# Patient Record
Sex: MALE
[De-identification: names, ages, dates, MRNs, and addresses within clinical notes are randomized per-mention and may not be internally consistent; named-entity substitution may affect disease eponyms.]

---
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
## 2024-07-08 ENCOUNTER — NON-APPOINTMENT (OUTPATIENT)
Age: 24
End: 2024-07-08

## 2024-07-10 ENCOUNTER — INPATIENT (INPATIENT)
Facility: HOSPITAL | Age: 24
LOS: 0 days | Discharge: HOME | DRG: 392 | End: 2024-07-11
Attending: STUDENT IN AN ORGANIZED HEALTH CARE EDUCATION/TRAINING PROGRAM | Admitting: STUDENT IN AN ORGANIZED HEALTH CARE EDUCATION/TRAINING PROGRAM
Payer: COMMERCIAL

## 2024-07-10 VITALS
OXYGEN SATURATION: 96 % | SYSTOLIC BLOOD PRESSURE: 127 MMHG | HEIGHT: 69 IN | TEMPERATURE: 99 F | HEART RATE: 81 BPM | WEIGHT: 160.94 LBS | RESPIRATION RATE: 18 BRPM | DIASTOLIC BLOOD PRESSURE: 77 MMHG

## 2024-07-10 PROCEDURE — 99285 EMERGENCY DEPT VISIT HI MDM: CPT

## 2024-07-10 NOTE — ED ADULT TRIAGE NOTE - PAIN: PRESENCE, MLM
Additional Notes (Optional): Patientâs scalp is healing well from 3rd blue light treatment. No more treatments needed. Detail Level: Detailed Hide Additional Notes?: No complains of pain/discomfort

## 2024-07-10 NOTE — ED ADULT TRIAGE NOTE - CHIEF COMPLAINT QUOTE
"I was diagnosed with colitis at [facility] today and tonight I threw up and it was dark brown"

## 2024-07-11 VITALS
HEART RATE: 61 BPM | TEMPERATURE: 98 F | SYSTOLIC BLOOD PRESSURE: 142 MMHG | RESPIRATION RATE: 18 BRPM | OXYGEN SATURATION: 97 % | DIASTOLIC BLOOD PRESSURE: 88 MMHG

## 2024-07-11 DIAGNOSIS — K52.9 NONINFECTIVE GASTROENTERITIS AND COLITIS, UNSPECIFIED: ICD-10-CM

## 2024-07-11 DIAGNOSIS — K92.0 HEMATEMESIS: ICD-10-CM

## 2024-07-11 DIAGNOSIS — Z29.9 ENCOUNTER FOR PROPHYLACTIC MEASURES, UNSPECIFIED: ICD-10-CM

## 2024-07-11 LAB
ALBUMIN SERPL ELPH-MCNC: 4.6 G/DL — SIGNIFICANT CHANGE UP (ref 3.3–5)
ALP SERPL-CCNC: 57 U/L — SIGNIFICANT CHANGE UP (ref 30–120)
ALT FLD-CCNC: 15 U/L — SIGNIFICANT CHANGE UP (ref 10–60)
ANION GAP SERPL CALC-SCNC: 8 MMOL/L — SIGNIFICANT CHANGE UP (ref 5–17)
APPEARANCE UR: CLEAR — SIGNIFICANT CHANGE UP
AST SERPL-CCNC: 17 U/L — SIGNIFICANT CHANGE UP (ref 10–40)
BASOPHILS # BLD AUTO: 0.02 K/UL — SIGNIFICANT CHANGE UP (ref 0–0.2)
BASOPHILS NFR BLD AUTO: 0.2 % — SIGNIFICANT CHANGE UP (ref 0–2)
BILIRUB SERPL-MCNC: 0.6 MG/DL — SIGNIFICANT CHANGE UP (ref 0.2–1.2)
BILIRUB UR-MCNC: NEGATIVE — SIGNIFICANT CHANGE UP
BUN SERPL-MCNC: 11 MG/DL — SIGNIFICANT CHANGE UP (ref 7–23)
CALCIUM SERPL-MCNC: 9.5 MG/DL — SIGNIFICANT CHANGE UP (ref 8.4–10.5)
CHLORIDE SERPL-SCNC: 104 MMOL/L — SIGNIFICANT CHANGE UP (ref 96–108)
CO2 SERPL-SCNC: 27 MMOL/L — SIGNIFICANT CHANGE UP (ref 22–31)
COLOR SPEC: YELLOW — SIGNIFICANT CHANGE UP
CREAT SERPL-MCNC: 1.12 MG/DL — SIGNIFICANT CHANGE UP (ref 0.5–1.3)
DIFF PNL FLD: NEGATIVE — SIGNIFICANT CHANGE UP
EC EAEA GENE STL QL NAA+PROBE: DETECTED
EGFR: 96 ML/MIN/1.73M2 — SIGNIFICANT CHANGE UP
EOSINOPHIL # BLD AUTO: 0 K/UL — SIGNIFICANT CHANGE UP (ref 0–0.5)
EOSINOPHIL NFR BLD AUTO: 0 % — SIGNIFICANT CHANGE UP (ref 0–6)
GI PCR PANEL: DETECTED
GLUCOSE SERPL-MCNC: 107 MG/DL — HIGH (ref 70–99)
GLUCOSE UR QL: NEGATIVE MG/DL — SIGNIFICANT CHANGE UP
HCT VFR BLD CALC: 37.7 % — LOW (ref 39–50)
HGB BLD-MCNC: 12.9 G/DL — LOW (ref 13–17)
IMM GRANULOCYTES NFR BLD AUTO: 0.5 % — SIGNIFICANT CHANGE UP (ref 0–0.9)
KETONES UR-MCNC: 15 MG/DL
LEUKOCYTE ESTERASE UR-ACNC: NEGATIVE — SIGNIFICANT CHANGE UP
LIDOCAIN IGE QN: 15 U/L — LOW (ref 16–77)
LYMPHOCYTES # BLD AUTO: 1.19 K/UL — SIGNIFICANT CHANGE UP (ref 1–3.3)
LYMPHOCYTES # BLD AUTO: 11.3 % — LOW (ref 13–44)
MCHC RBC-ENTMCNC: 27.6 PG — SIGNIFICANT CHANGE UP (ref 27–34)
MCHC RBC-ENTMCNC: 34.2 GM/DL — SIGNIFICANT CHANGE UP (ref 32–36)
MCV RBC AUTO: 80.6 FL — SIGNIFICANT CHANGE UP (ref 80–100)
MONOCYTES # BLD AUTO: 0.97 K/UL — HIGH (ref 0–0.9)
MONOCYTES NFR BLD AUTO: 9.2 % — SIGNIFICANT CHANGE UP (ref 2–14)
NEUTROPHILS # BLD AUTO: 8.33 K/UL — HIGH (ref 1.8–7.4)
NEUTROPHILS NFR BLD AUTO: 78.8 % — HIGH (ref 43–77)
NITRITE UR-MCNC: NEGATIVE — SIGNIFICANT CHANGE UP
NRBC # BLD: 0 /100 WBCS — SIGNIFICANT CHANGE UP (ref 0–0)
PH UR: 7.5 — SIGNIFICANT CHANGE UP (ref 5–8)
PLATELET # BLD AUTO: 251 K/UL — SIGNIFICANT CHANGE UP (ref 150–400)
POTASSIUM SERPL-MCNC: 3.8 MMOL/L — SIGNIFICANT CHANGE UP (ref 3.5–5.3)
POTASSIUM SERPL-SCNC: 3.8 MMOL/L — SIGNIFICANT CHANGE UP (ref 3.5–5.3)
PROT SERPL-MCNC: 7.9 G/DL — SIGNIFICANT CHANGE UP (ref 6–8.3)
PROT UR-MCNC: NEGATIVE MG/DL — SIGNIFICANT CHANGE UP
RBC # BLD: 4.68 M/UL — SIGNIFICANT CHANGE UP (ref 4.2–5.8)
RBC # FLD: 12.4 % — SIGNIFICANT CHANGE UP (ref 10.3–14.5)
SODIUM SERPL-SCNC: 139 MMOL/L — SIGNIFICANT CHANGE UP (ref 135–145)
SP GR SPEC: 1.01 — SIGNIFICANT CHANGE UP (ref 1–1.03)
UROBILINOGEN FLD QL: 0.2 MG/DL — SIGNIFICANT CHANGE UP (ref 0.2–1)
WBC # BLD: 10.56 K/UL — HIGH (ref 3.8–10.5)
WBC # FLD AUTO: 10.56 K/UL — HIGH (ref 3.8–10.5)

## 2024-07-11 PROCEDURE — 80053 COMPREHEN METABOLIC PANEL: CPT

## 2024-07-11 PROCEDURE — 99285 EMERGENCY DEPT VISIT HI MDM: CPT | Mod: 25

## 2024-07-11 PROCEDURE — 99222 1ST HOSP IP/OBS MODERATE 55: CPT

## 2024-07-11 PROCEDURE — 81003 URINALYSIS AUTO W/O SCOPE: CPT

## 2024-07-11 PROCEDURE — 87507 IADNA-DNA/RNA PROBE TQ 12-25: CPT

## 2024-07-11 PROCEDURE — 36415 COLL VENOUS BLD VENIPUNCTURE: CPT

## 2024-07-11 PROCEDURE — 85025 COMPLETE CBC W/AUTO DIFF WBC: CPT

## 2024-07-11 PROCEDURE — 96375 TX/PRO/DX INJ NEW DRUG ADDON: CPT

## 2024-07-11 PROCEDURE — 96374 THER/PROPH/DIAG INJ IV PUSH: CPT

## 2024-07-11 PROCEDURE — 83690 ASSAY OF LIPASE: CPT

## 2024-07-11 RX ORDER — PIPERACILLIN SODIUM AND TAZOBACTAM SODIUM 3; .375 G/15ML; G/15ML
3.38 INJECTION, POWDER, LYOPHILIZED, FOR SOLUTION INTRAVENOUS ONCE
Refills: 0 | Status: COMPLETED | OUTPATIENT
Start: 2024-07-11 | End: 2024-07-11

## 2024-07-11 RX ORDER — SODIUM CHLORIDE 0.9 % (FLUSH) 0.9 %
1000 SYRINGE (ML) INJECTION ONCE
Refills: 0 | Status: COMPLETED | OUTPATIENT
Start: 2024-07-11 | End: 2024-07-11

## 2024-07-11 RX ORDER — DIAZEPAM 10 MG/1
5 TABLET ORAL ONCE
Refills: 0 | Status: DISCONTINUED | OUTPATIENT
Start: 2024-07-11 | End: 2024-07-11

## 2024-07-11 RX ORDER — ONDANSETRON HYDROCHLORIDE 2 MG/ML
1 INJECTION INTRAMUSCULAR; INTRAVENOUS
Refills: 0 | DISCHARGE

## 2024-07-11 RX ORDER — DEXTROSE MONOHYDRATE AND SODIUM CHLORIDE 5; .3 G/100ML; G/100ML
1000 INJECTION, SOLUTION INTRAVENOUS
Refills: 0 | Status: DISCONTINUED | OUTPATIENT
Start: 2024-07-11 | End: 2024-07-11

## 2024-07-11 RX ORDER — METOCLOPRAMIDE 5 MG/5ML
10 SOLUTION ORAL ONCE
Refills: 0 | Status: COMPLETED | OUTPATIENT
Start: 2024-07-11 | End: 2024-07-11

## 2024-07-11 RX ORDER — PANTOPRAZOLE SODIUM 40 MG/10ML
40 INJECTION, POWDER, FOR SOLUTION INTRAVENOUS
Refills: 0 | Status: DISCONTINUED | OUTPATIENT
Start: 2024-07-11 | End: 2024-07-11

## 2024-07-11 RX ORDER — PANTOPRAZOLE SODIUM 40 MG/10ML
40 INJECTION, POWDER, FOR SOLUTION INTRAVENOUS DAILY
Refills: 0 | Status: DISCONTINUED | OUTPATIENT
Start: 2024-07-11 | End: 2024-07-11

## 2024-07-11 RX ORDER — PIPERACILLIN SODIUM AND TAZOBACTAM SODIUM 3; .375 G/15ML; G/15ML
3.38 INJECTION, POWDER, LYOPHILIZED, FOR SOLUTION INTRAVENOUS EVERY 8 HOURS
Refills: 0 | Status: DISCONTINUED | OUTPATIENT
Start: 2024-07-11 | End: 2024-07-11

## 2024-07-11 RX ORDER — CEFPODOXIME PROXETIL 50 MG/5 ML
1 SUSPENSION, RECONSTITUTED, ORAL (ML) ORAL
Qty: 14 | Refills: 0
Start: 2024-07-11 | End: 2024-07-17

## 2024-07-11 RX ORDER — METRONIDAZOLE 500 MG/1
1 TABLET ORAL
Refills: 0 | DISCHARGE

## 2024-07-11 RX ORDER — MAGNESIUM, ALUMINUM HYDROXIDE 400-400
30 TABLET,CHEWABLE ORAL EVERY 4 HOURS
Refills: 0 | Status: DISCONTINUED | OUTPATIENT
Start: 2024-07-11 | End: 2024-07-11

## 2024-07-11 RX ORDER — CIPROFLOXACIN HCL 500 MG
1 TABLET ORAL
Refills: 0 | DISCHARGE

## 2024-07-11 RX ORDER — ONDANSETRON HYDROCHLORIDE 2 MG/ML
4 INJECTION INTRAMUSCULAR; INTRAVENOUS EVERY 8 HOURS
Refills: 0 | Status: DISCONTINUED | OUTPATIENT
Start: 2024-07-11 | End: 2024-07-11

## 2024-07-11 RX ORDER — ACETAMINOPHEN 325 MG
650 TABLET ORAL EVERY 6 HOURS
Refills: 0 | Status: DISCONTINUED | OUTPATIENT
Start: 2024-07-11 | End: 2024-07-11

## 2024-07-11 RX ORDER — DIPHENHYDRAMINE HCL 12.5MG/5ML
25 ELIXIR ORAL ONCE
Refills: 0 | Status: COMPLETED | OUTPATIENT
Start: 2024-07-11 | End: 2024-07-11

## 2024-07-11 RX ORDER — METOCLOPRAMIDE 5 MG/5ML
10 SOLUTION ORAL EVERY 6 HOURS
Refills: 0 | Status: DISCONTINUED | OUTPATIENT
Start: 2024-07-11 | End: 2024-07-11

## 2024-07-11 RX ADMIN — DIAZEPAM 5 MILLIGRAM(S): 10 TABLET ORAL at 06:54

## 2024-07-11 RX ADMIN — Medication 25 MILLIGRAM(S): at 01:55

## 2024-07-11 RX ADMIN — Medication 1000 MILLILITER(S): at 01:30

## 2024-07-11 RX ADMIN — METOCLOPRAMIDE 10 MILLIGRAM(S): 5 SOLUTION ORAL at 01:29

## 2024-07-11 RX ADMIN — DIAZEPAM 5 MILLIGRAM(S): 10 TABLET ORAL at 01:54

## 2024-07-11 RX ADMIN — PANTOPRAZOLE SODIUM 40 MILLIGRAM(S): 40 INJECTION, POWDER, FOR SOLUTION INTRAVENOUS at 05:38

## 2024-07-11 RX ADMIN — PIPERACILLIN SODIUM AND TAZOBACTAM SODIUM 200 GRAM(S): 3; .375 INJECTION, POWDER, LYOPHILIZED, FOR SOLUTION INTRAVENOUS at 05:14

## 2024-07-11 RX ADMIN — PIPERACILLIN SODIUM AND TAZOBACTAM SODIUM 25 GRAM(S): 3; .375 INJECTION, POWDER, LYOPHILIZED, FOR SOLUTION INTRAVENOUS at 08:33

## 2024-07-11 RX ADMIN — DEXTROSE MONOHYDRATE AND SODIUM CHLORIDE 100 MILLILITER(S): 5; .3 INJECTION, SOLUTION INTRAVENOUS at 05:13

## 2024-07-11 RX ADMIN — METOCLOPRAMIDE 10 MILLIGRAM(S): 5 SOLUTION ORAL at 06:53

## 2024-07-11 RX ADMIN — Medication 1000 MILLILITER(S): at 00:30

## 2024-07-11 RX ADMIN — Medication 1000 MILLILITER(S): at 01:29

## 2024-07-11 RX ADMIN — Medication 1000 MILLILITER(S): at 02:29

## 2024-07-11 RX ADMIN — ONDANSETRON HYDROCHLORIDE 4 MILLIGRAM(S): 2 INJECTION INTRAMUSCULAR; INTRAVENOUS at 08:33

## 2024-07-11 NOTE — CARE COORDINATION ASSESSMENT. - OTHER PERTINENT REFERRAL INFORMATION
Sw Met w/ Pt at bedside. Pt is A & O x4. Pt has 5 DARLENE and a full flight inside and he currently lives with his father.  Pt works part time and is a student. He has no HX of KUSHAL, DME or HC and was Indep w/ all ADL's PTA. He currently does not have a PCP and can get RX at [facility]: P [phone].

## 2024-07-11 NOTE — DISCHARGE NOTE PROVIDER - NSDCMRMEDTOKEN_GEN_ALL_CORE_FT
cefpodoxime 100 mg oral tablet: 1 tab(s) orally 2 times a day  metroNIDAZOLE 500 mg oral tablet: 1 tab(s) orally 3 times a day  Zofran 4 mg oral tablet: 1 tab(s) orally every 8 hours as needed for  nausea

## 2024-07-11 NOTE — ED ADULT NURSE NOTE - OBJECTIVE STATEMENT
23yr old male walked into ED c/o vomiting x2 and thinks there may have been some blood in the vomit; states it was dark brown; pt recently diagnosed with colitis and place on antibiotics; diagnosed Wednesday morning; states he had a CT scan at a hospital in [place].

## 2024-07-11 NOTE — H&P ADULT - TIME BILLING
n/v/d, hematemesis  colitis on ct abd from [facility]  likely gastritis as well  iv ppi bid, zosyn, reglan and zofran prn, gi consult  plan discussed with patient; counseled on marijuana cessation

## 2024-07-11 NOTE — ED PROVIDER NOTE - CLINICAL SUMMARY MEDICAL DECISION MAKING FREE TEXT BOX
23y M with n/v abd pain, has already had work up and ct but has intractable n/v and pain, and looked like blood in vomit tonight - iv, labs, hydrate, control n/v, may require admission if unable to tolerate PO

## 2024-07-11 NOTE — SBIRT NOTE ADULT - NSSBIRTDRGBRIEFINTDET_GEN_A_CORE
Pt was educated about marijuana use and offered information about cessation which he declined at this time.

## 2024-07-11 NOTE — ED PROVIDER NOTE - PROGRESS NOTE DETAILS
pt continues to vomit, does appear to have blood mixed with GI fluids, not bright red, d/w pt admission for intractable vomiting, pt is anxious about staying, possibly anxious related to reglan that was recently given, will give benedryl for possible reglan reaction and also valium for continued n/v

## 2024-07-11 NOTE — ED PROVIDER NOTE - CARE PLAN
Principal Discharge DX:	Colitis  Secondary Diagnosis:	Intractable nausea and vomiting  Secondary Diagnosis:	Hematemesis   1

## 2024-07-11 NOTE — H&P ADULT - PROBLEM SELECTOR PLAN 3
Headache, Unspecified    Headaches can be caused by a number of things. The cause of your headache isn’t clear. But it doesn’t seem to be a sign of any serious illness.  You could have a tension headache or a migraine headache.  Stress can cause a tension headache. This can happen if you tense the muscles of your shoulders, neck, and scalp without knowing it. If this stress lasts long enough, you may develop a tension headache.  It is not clear why migraines occur, but transient factors called\" triggers\" can raise the risk of having a migraine attack. Migraine triggers may include emotional stress or depression, or by hormone changes during the menstrual cycle. Other triggers include birth control pills and other medicines, alcohol or caffeine, foods with tyramine, eye strain, weather changes, missed meals, and lack of sleep or oversleeping.  Other causes of headache include:  · Viral illness with high fever  · Head injury with concussion  · Sinus, ear, or throat infection  · Dental pain and jaw joint (TMJ) pain  More serious but less common causes of headache include stroke, brain hemorrhage, brain tumor, meningitis, and encephalitis.  Home care  Follow these tips when taking care of yourself at home:  · Don’t drive yourself home if you were given pain medicine for your headache. Instead, have someone else drive you home. Try to sleep when you get home. You should feel much better when you wake up.  · Apply heat to the back of your neck to ease a neck muscle spasm. Take care of a migraine headache by putting an ice pack on your forehead or at the base of your skull.  · If you have nausea or vomiting, eat a light diet until your headache eases.  · If you have a migraine headache, use sunglasses when in the daylight or around bright indoor lighting until your symptoms get better. Bright glaring light can make this type of headache worse.  Follow-up care  Follow up with your health care provider if your headache  doesn’t get better within the next 24 hours. Talk with your provider If you have frequent headaches. He or she can help figure out a treatment plan. By knowing the earliest signs of headache, and starting treatment right away, you may be able to stop the pain yourself.  When to seek medical advice  Call your health care provider right away if any of these occur:  · Your head pain gets worse  · Your head pain doesn’t get better within 24 hours  · You aren’t able to keep liquids down (repeated vomiting)  · Fever of 100.4ºF (38ºC) or higher, or as directed by your health care provider  · Stiff neck  · Extreme drowsiness, confusion, or fainting  · Dizziness or dizziness with spinning sensation (vertigo)  · Weakness in an arm or leg or one side of your face  · You have difficulty talking or seeing  © 1641-4964 [employer]. [address] [place] [address], [place] [address]. All rights reserved. This information is not intended as a substitute for professional medical care. Always follow your healthcare professional's instructions.         Encourage ambulation

## 2024-07-11 NOTE — H&P ADULT - NSICDXFAMHXNEG_GEN_ALL
Partially impaired: cannot see medication labels or newsprint, but can see obstacles in path, and the surrounding layout; can count fingers at arm's length cancer/congestive heart failure

## 2024-07-11 NOTE — H&P ADULT - NSHPPHYSICALEXAM_GEN_ALL_CORE
T(C): 37.1 (07-11-24 @ 01:52), Max: 37.2 (07-10-24 @ 23:37)  HR: 84 (07-11-24 @ 01:52) (81 - 84)  BP: 143/74 (07-11-24 @ 01:52) (127/77 - 143/74)  RR: 18 (07-11-24 @ 01:52) (18 - 18)  SpO2: 96% (07-11-24 @ 01:52) (96% - 96%)    GENERAL: patient appears well, no acute distress, appropriate, pleasant  EYES: sclera clear, no exudates  ENMT: moist mucous membranes  LUNGS: good air entry bilaterally, clear to auscultation, symmetric breath sounds, no wheezing or rhonchi appreciated  HEART: soft S1/S2, regular rate and rhythm, no murmurs noted, no lower extremity edema  GASTROINTESTINAL: abdomen is soft, nontender, nondistended, normoactive bowel sounds, no palpable masses  INTEGUMENT: good skin turgor, warm skin, appears well perfused  MUSCULOSKELETAL: no clubbing or cyanosis, no obvious deformity  NEUROLOGIC: awake, alert, oriented x3  HEME/LYMPH:  no obvious ecchymosis or petechiae

## 2024-07-11 NOTE — CARE COORDINATION ASSESSMENT. - NSCAREPROVIDERS_GEN_ALL_CORE_FT
CARE PROVIDERS:  Accepting Physician: Juan M Berg  Access Services: Elsie Vega  Administration: Gordo Sherwood  Admitting: Juan M Berg  Attending: Juan M Berg  Consultant: Jorge Pina  ED Attending: Mervat Dillon  ED Nurse: Angeli Traore  Emergency Medicine: Scarlett Woodward  Nurse: Saurav Mccord  Nurse: Halina Grimes  Nurse: Marianne Bee  Nurse: Angeli Traore  PCA/Nursing Assistant: Blessing Aleman  PCA/Nursing Assistant: Ke Buchanan  Primary Team: Gem De La Rosa  Primary Team: Earline Elizondo  Primary Team: Juan M Berg  : Vani Desir  : Lizzy Gordon  : Liz Maki  Team: [facility] Hospitalists, Team  UR// Supp. Assoc.: Glo Avila

## 2024-07-11 NOTE — CARE COORDINATION ASSESSMENT. - LIVES WITH, PROFILE
ANTICOAGULATION THERAPY EDUCATION   PATIENT  INSTRUCTION    Your Guide to Using Warfarin:  Please refer to this handout for questions regarding your medication, Coumadin/Warfarin. This handout includes information on dosing, blood testing, possible side effects of Coumadin/Warfarin, using other medications, dietary guidelines and safety concerns while on anticoagulation therapy.    Please contact your primary care physician and/or seek appropriate medical care if you experience:  · A serious fall  · Increased menstrual bleeding   · An injury to your head  · Notice a different color urine or stool   · Increased bleeding with teeth brushing   · If you have any other unusual bruising   · Have bleeding from your nose or a cut that doesn't stop bleeding         Call your physician immediately if you notice any signs and symptoms of a blood clot such as:  · Sudden weakness in any limb  · Dizziness or faintness   · Numbness or tingling anywhere  · New shortness of breath or chest pain   · Sudden onset of slurred speech or inability to speak  · New pain, swelling, redness or heat in any extremity   · Visual changes or loss of sight in either eye         Other factors that may affect your anticoagulation therapy include:  · Fever  · Stress   · Diarrhea  · Changes in exercise/activity level   · Vomiting         While on Anticoagulation therapy avoid:  · Pregnancy, using birth control and hormone replacement therapy    · Body piercing or tattoos      Please inform family members and other health care providers that you are on anticoagulation therapy. Make sure to carry an up-to-date medication list. You can also wear a medical alert bracelet to identify that you are on anticoagulation therapy.      
parents

## 2024-07-11 NOTE — H&P ADULT - ASSESSMENT
23yM no pmhx admitted for colitis       to the ED for complaints of n/v/d x 1 week. He states he had fried fish from a restaurant around the time sx started. He went to [facility] yesterday and had a CT scan of the abd showing mild colitis of the transverse and descending colon. He was discharged home on cipro flagyl. He continued having n/v and developed some blood in his vomit so he came to the ED. He states he was unable to keep the cipro or flagyl down. Denies fever, chills, cp, sob, abd pain, constipation.    ED Course:  Vitals: 99F, 81bpm, 127/77, 96% RA  Significant Labs: mild leukocytosis and anemia, normal cmp and lipase  Received valium, benadryl, reglan, 2L IVF in the ED     23yM no pmhx admitted for colitis       23yM no pmhx admitted for colitis and n/v with hematemesis

## 2024-07-11 NOTE — H&P ADULT - HISTORY OF PRESENT ILLNESS
23yM no pmhx presented to the ED for complaints of n/v/d x 1 week. He states he had fried fish from a restaurant around the time sx started. He went to [facility] yesterday and had a CT scan of the abd showing mild colitis of the transverse and descending colon. He was discharged home on cipro flagyl. He continued having n/v and developed some hematemesis so he came to the ED. He states he was unable to keep the cipro or flagyl down. Denies fever, chills, cp, sob, abd pain, constipation.    ED Course:  Vitals: 99F, 81bpm, 127/77, 96% RA  Significant Labs: mild leukocytosis and anemia, normal cmp and lipase  Received valium, benadryl, reglan, 2L IVF in the ED

## 2024-07-11 NOTE — CARE COORDINATION ASSESSMENT. - QUALITY OF FAMILY RELATIONSHIPS
----- Message from Ruby Cuevas sent at 5/8/2023  3:42 PM CDT -----  Regarding: Schedule Appt  Pt calling to schedule an appointment. Please advise. Requesting a call back.        [phone]                    
Spoke with patient, she saw Dr. Rivera while in the hospital in February and clinic follow up in March.  She saw Dr. Florez in April and does not want to continue with him.  Patient cancelled appointment with Dr. Florez and rescheduled with Dr. Rivera.  
supportive/involved

## 2024-07-11 NOTE — DISCHARGE NOTE PROVIDER - HOSPITAL COURSE
23M admitted for Colitis after consuming seafood.  Suspect Gastroenteritis mediated colitis.  Advised to stay for IV Antibiotics before transition to oral but wants to leave.  Will discharge with PO Antibiotics and advised to return if symptoms worsen.     CT Scan was done at [place] 24 hours prior patient brought the copy in with him.         PHYSICAL EXAM:  Vital Signs Last 24 Hrs  T(C): 36.7 (11 Jul 2024 11:04), Max: 37.2 (10 Jul 2024 23:37)  T(F): 98 (11 Jul 2024 11:04), Max: 99 (10 Jul 2024 23:37)  HR: 61 (11 Jul 2024 11:04) (61 - 84)  BP: 142/88 (11 Jul 2024 11:04) (127/77 - 156/82)  BP(mean): --  RR: 18 (11 Jul 2024 11:04) (17 - 18)  SpO2: 97% (11 Jul 2024 11:04) (95% - 97%)    Parameters below as of 11 Jul 2024 11:04  Patient On (Oxygen Delivery Method): room air        GENERAL: NAD  HEAD:  Atraumatic, Normocephalic  ENMT: Jose Mucous Membranes  NECK: Supple, No JVD, Normal thyroid  HEART: Regular rate and rhythm; No murmurs, rubs, or gallops  CHEST/LUNG: Clear to auscultation bilaterally; No rales, rhonchi, wheezing, or rubs  ABDOMEN: Mild Tenderness lower abdomen  EXTREMITIES:  2+ Peripheral Pulses, No clubbing, cyanosis, or edema  SKIN: No rashes or lesions

## 2024-07-11 NOTE — CHART NOTE - NSCHARTNOTEFT_GEN_A_CORE
Patient seen and examined.  Admitted for Gastroenteritis / Colitis.  Uncomfortable and continues to feel nauseous. Will continue antibiotics and follow up.  H&P Reviewed. Patient seen and examined.  Admitted for Gastroenteritis / Colitis.  Uncomfortable and continues to feel nauseous. Will continue antibiotics and follow up.  H&P Reviewed.    Addendum: Family at bedside with mother and girlfriend. Upset that he wont get a bed due to bed availability and wants to go home.  Advised should stay for IV Antibiotics but insistent on going home. Will discharge with PO Vantin + Flagyl and advised to return if symptoms worsen.

## 2024-07-11 NOTE — H&P ADULT - PROBLEM SELECTOR PLAN 2
Persistent n/v x 1 week with some hematemesis yesterday  - likely 2/2 gastritis given poor PO intake and continued vomiting  - CLD as tolerated; IVF; IV PPI BID; reglan and zofran prn  - could be a component of marijuana hyperemesis syndrome given daily marijuana use  - patient counseled on marijuana cessation

## 2024-07-11 NOTE — H&P ADULT - PROBLEM SELECTOR PLAN 1
CT abd from [facility] with mild colitis of the transverse and descending colon  - infectious vs ischemic colitis  - CT abd from [facility] ED with mild colitis of the transverse and descending colon  - discharged on cipro flagyl from [facility] but was unable to keep down the pills  - still with n/v/d; likely infectious vs inflammatory colitis  - will start zosyn for now  - f/u GI PCR  - GI consult f/u recs CT abd from [facility] ED with mild colitis of the transverse and descending colon  - discharged on cipro flagyl from [facility] but was unable to keep down the pills  - still with n/v/d; r/o infectious vs inflammatory colitis  - will start zosyn for now  - f/u GI PCR  - GI consult f/u recs

## 2024-07-11 NOTE — CONSULT NOTE ADULT - ASSESSMENT
colitis  ?gastroenteritis    PLAN  follow up stool studies  abx  PPI BID  Carafate q 6 hours.  bacid one tab tid  clears for now advance as tolerated  if stool negative consider imodium  replete electrolytes as needed   will follow     Discussed with Dr. Urbina.

## 2024-07-11 NOTE — ED PROVIDER NOTE - OBJECTIVE STATEMENT
23y M c/o vomiting blood, pt has h/o GERD and occasional vomiting when eating too much, which happened on July 4th - this isn't a daily occurrence but didn't seem odd given that he'd overeaten, then 2d ago started with multiple episodes of vomiting, seen at [facility] yesterday, given zofran, symptoms continued, went to [facility] ED this morning, states he had labs, ct a/p and meds, pt was told he has colitis and blood in the urine and sent home with 2 antibiotics and nausea medicine, states today he ate a banana and had some bone broth and water, tonight had 2 episodes of vomiting and it looked like there was dark blood in it both times, no fever, no diarrhea, right now does not have nausea, no abd pain now, states it mostly comes back after he eats

## 2024-07-11 NOTE — CONSULT NOTE ADULT - SUBJECTIVE AND OBJECTIVE BOX
Chief Complaint:  Patient is a 23y old  Male who presents with a chief complaint of colitis, n/v (11 Jul 2024 04:06)      HPI:  23 M admitted for colitis. Had about 1 week on n/v/d. Was seen in [place] yesterday, and was found to have mild colitis of the transverse and descending colon.  Was sent home on cipro/flagyl, took 1 dose, nausea/vomiting persisted, so came ot ED for further evaluation. Pt denies headache, dizziness, chest pain, palpitations, cough, SOB, abdominal pain, urinary symptoms, fevers, chills, weakness at this time. Tolerating clears at time of evaluation.     Allergies:  No Known Allergies      Medications:  acetaminophen     Tablet .. 650 milliGRAM(s) Oral every 6 hours PRN  aluminum hydroxide/magnesium hydroxide/simethicone Suspension 30 milliLiter(s) Oral every 4 hours PRN  lactated ringers. 1000 milliLiter(s) IV Continuous <Continuous>  melatonin 3 milliGRAM(s) Oral at bedtime PRN  metoclopramide Injectable 10 milliGRAM(s) IV Push every 6 hours PRN  ondansetron Injectable 4 milliGRAM(s) IV Push every 8 hours PRN  pantoprazole  Injectable 40 milliGRAM(s) IV Push two times a day  piperacillin/tazobactam IVPB.. 3.375 Gram(s) IV Intermittent every 8 hours      PMHX/PSHX:  No pertinent past medical history    No significant past surgical history        Family history:  No pertinent family history in first degree relatives        Social History:     ROS:     General:  No wt loss, fevers, chills, night sweats, fatigue,   Eyes:  Good vision, no reported pain  ENT:  No sore throat, pain, runny nose, dysphagia  CV:  No pain, palpitations, hypo/hypertension  Resp:  No dyspnea, cough, tachypnea, wheezing  GI: see HPI  :  No pain, bleeding, incontinence, nocturia  Muscle:  No pain, weakness  Neuro:  No weakness, tingling, memory problems  Psych:  No fatigue, insomnia, mood problems, depression  Endocrine:  No polyuria, polydipsia, cold/heat intolerance  Heme:  No petechiae, ecchymosis, easy bruisability  Skin:  No rash, tattoos, scars, edema      PHYSICAL EXAM:   Vital Signs:  Vital Signs Last 24 Hrs  T(C): 36.8 (11 Jul 2024 06:13), Max: 37.2 (10 Jul 2024 23:37)  T(F): 98.3 (11 Jul 2024 06:13), Max: 99 (10 Jul 2024 23:37)  HR: 78 (11 Jul 2024 06:13) (78 - 84)  BP: 156/82 (11 Jul 2024 06:13) (127/77 - 156/82)  BP(mean): --  RR: 17 (11 Jul 2024 06:13) (17 - 18)  SpO2: 95% (11 Jul 2024 06:13) (95% - 96%)    Parameters below as of 11 Jul 2024 01:52  Patient On (Oxygen Delivery Method): room air      Daily Height in cm: 175.26 (10 Jul 2024 23:37)    Daily     GENERAL:  Appears stated age, well-groomed, well-nourished, no distress  HEENT:  NC/AT,  conjunctivae clear and pink, no thyromegaly, nodules, adenopathy, no JVD, sclera -anicteric  CHEST:  Full & symmetric excursion, no increased effort, breath sounds clear  HEART:  Regular rhythm, S1, S2, no murmur/rub/S3/S4, no abdominal bruit, no edema  ABDOMEN:  Soft, +tender, non-distended, normoactive bowel sounds,  no masses ,no hepato-splenomegaly, no signs of chronic liver disease  EXTEREMITIES:  no cyanosis,clubbing or edema  SKIN:  No rash/erythema/ecchymoses/petechiae/wounds/abscess/warm/dry  NEURO:  Alert, oriented, no asterixis, no tremor, no encephalopathy    LABS:                        12.9   10.56 )-----------( 251      ( 11 Jul 2024 00:37 )             37.7     07-11    139  |  104  |  11  ----------------------------<  107<H>  3.8   |  27  |  1.12    Ca    9.5      11 Jul 2024 00:37    TPro  7.9  /  Alb  4.6  /  TBili  0.6  /  DBili  x   /  AST  17  /  ALT  15  /  AlkPhos  57  07-11    LIVER FUNCTIONS - ( 11 Jul 2024 00:37 )  Alb: 4.6 g/dL / Pro: 7.9 g/dL / ALK PHOS: 57 U/L / ALT: 15 U/L / AST: 17 U/L / GGT: x             Urinalysis Basic - ( 11 Jul 2024 00:37 )    Color: x / Appearance: x / SG: x / pH: x  Gluc: 107 mg/dL / Ketone: x  / Bili: x / Urobili: x   Blood: x / Protein: x / Nitrite: x   Leuk Esterase: x / RBC: x / WBC x   Sq Epi: x / Non Sq Epi: x / Bacteria: x      Amylase Serum--      Lipase serum15       Ammonia--      Imaging:

## 2024-07-11 NOTE — ED ADULT NURSE NOTE - CHIEF COMPLAINT QUOTE
"I was diagnosed with colitis at [facility] today and tonight I threw up and it was dark brown"

## 2024-07-11 NOTE — DISCHARGE NOTE NURSING/CASE MANAGEMENT/SOCIAL WORK - PATIENT PORTAL LINK FT
You can access the FollowMyHealth Patient Portal offered by [facility] by registering at the following website: [email or web address]. By joining [facility]’s FollowMyHealth portal, you will also be able to view your health information using other applications (apps) compatible with our system.